# Patient Record
Sex: MALE | Race: WHITE | NOT HISPANIC OR LATINO | URBAN - NONMETROPOLITAN AREA
[De-identification: names, ages, dates, MRNs, and addresses within clinical notes are randomized per-mention and may not be internally consistent; named-entity substitution may affect disease eponyms.]

---
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
## 2022-02-03 ENCOUNTER — HOSPITAL ENCOUNTER (EMERGENCY)
Facility: HOSPITAL | Age: 22
Discharge: HOME | End: 2022-02-03
Attending: STUDENT IN AN ORGANIZED HEALTH CARE EDUCATION/TRAINING PROGRAM
Payer: MEDICAID

## 2022-02-03 VITALS
HEIGHT: 70 IN | OXYGEN SATURATION: 99 % | TEMPERATURE: 99 F | WEIGHT: 142.63 LBS | BODY MASS INDEX: 20.42 KG/M2 | SYSTOLIC BLOOD PRESSURE: 144 MMHG | RESPIRATION RATE: 18 BRPM | HEART RATE: 71 BPM | DIASTOLIC BLOOD PRESSURE: 77 MMHG

## 2022-02-03 DIAGNOSIS — S05.01XA ABRASION OF RIGHT CORNEA, INITIAL ENCOUNTER: Primary | ICD-10-CM

## 2022-02-03 PROCEDURE — 99283 EMERGENCY DEPT VISIT LOW MDM: CPT | Mod: 25

## 2022-02-03 PROCEDURE — 25000003 PHARM REV CODE 250: Performed by: STUDENT IN AN ORGANIZED HEALTH CARE EDUCATION/TRAINING PROGRAM

## 2022-02-03 RX ORDER — ERYTHROMYCIN 5 MG/G
OINTMENT OPHTHALMIC
Qty: 1 G | Refills: 0 | Status: SHIPPED | OUTPATIENT
Start: 2022-02-03

## 2022-02-03 RX ORDER — TETRACAINE HYDROCHLORIDE 5 MG/ML
2 SOLUTION OPHTHALMIC
Status: COMPLETED | OUTPATIENT
Start: 2022-02-03 | End: 2022-02-03

## 2022-02-03 RX ADMIN — FLUORESCEIN SODIUM 1 EACH: 1 STRIP OPHTHALMIC at 10:02

## 2022-02-03 RX ADMIN — TETRACAINE HYDROCHLORIDE 2 DROP: 5 SOLUTION OPHTHALMIC at 10:02

## 2022-02-03 NOTE — ED PROVIDER NOTES
Encounter Date: 2/3/2022       History     Chief Complaint   Patient presents with    Eye Pain     About a week and half ago something flew in his right eye and he has not had any relief from the pain. Pt is a .     21-year-old male with no significant past medical history presents with right eye pain that has been going on for a week after having something fly in his eye.  Patient has not tried to take anything for pain.  Patient says that his vision on the right eye has progressively gotten worse and is blurry now.  Patient does not wear contact.  Denies any other associated        Review of patient's allergies indicates:  No Known Allergies  No past medical history on file.  No past surgical history on file.  No family history on file.  Social History     Tobacco Use    Smoking status: Never Smoker    Smokeless tobacco: Never Used     Review of Systems   Constitutional: Negative.    HENT: Negative.    Eyes: Positive for pain, redness and visual disturbance.   Respiratory: Negative.    Cardiovascular: Negative.    Gastrointestinal: Negative.    Genitourinary: Negative.    Musculoskeletal: Negative.    Skin: Negative.    Neurological: Negative.    Psychiatric/Behavioral: Negative.    All other systems reviewed and are negative.      Physical Exam     Initial Vitals [02/03/22 0946]   BP Pulse Resp Temp SpO2   (!) 144/77 71 18 98.9 °F (37.2 °C) 99 %      MAP       --         Physical Exam    Nursing note and vitals reviewed.  Constitutional: Vital signs are normal. He appears well-developed and well-nourished.   HENT:   Head: Normocephalic and atraumatic.   Eyes: Lids are normal.   Conjunctival injection.  No Alisa sign.  Corneal abrasion to left upper iris   Neck: Trachea normal. Neck supple.   Cardiovascular: Normal rate, regular rhythm, normal heart sounds and normal pulses.   Pulmonary/Chest: Breath sounds normal. He has no wheezes. He has no rhonchi.   Abdominal: Abdomen is soft. Bowel sounds are  normal. He exhibits no distension. There is no abdominal tenderness. There is no rebound and no guarding.   Musculoskeletal:         General: Normal range of motion.      Cervical back: Neck supple.     Neurological: He is alert and oriented to person, place, and time. He has normal strength. GCS eye subscore is 4. GCS verbal subscore is 5. GCS motor subscore is 6.   Skin: Skin is warm. Capillary refill takes less than 2 seconds.   Psychiatric: He has a normal mood and affect. His speech is normal. Thought content normal.         ED Course   Procedures  Labs Reviewed - No data to display       Imaging Results    None          Medications   TETRAcaine HCl (PF) 0.5 % Drop 2 drop (2 drops Right Eye Given 2/3/22 1047)   fluorescein ophthalmic strip 1 each (1 each Right Eye Given 2/3/22 1047)     Medical Decision Making:   Initial Assessment:   21-year-old male with no significant past medical history presents with right eye pain that has been going on for a week after having something fly in his eye.  Afebrile vitals stable.  Physical noted.  Patient has corneal abrasion.  Symptoms relieved that the tetracaine.  Corneal abrasion on iris not involving pupil.  Will erythromycin and  opthalmology                      Clinical Impression:   Final diagnoses:  [S05.01XA] Abrasion of right cornea, initial encounter (Primary)          ED Disposition Condition    Discharge Stable        ED Prescriptions     Medication Sig Dispense Start Date End Date Auth. Provider    erythromycin (ROMYCIN) ophthalmic ointment Place a 1/2 inch ribbon of ointment into the lower eyelid. 5 days 1 g 2/3/2022  David Bowie MD        Follow-up Information     Follow up With Specialties Details Why Contact Info    Bradley Pink MD Ophthalmology   [address] [facility] [address]  [facility]  [facility] [address]  [phone]             David Bowie MD  02/03/22 9759

## 2022-02-03 NOTE — Clinical Note
"Bart Lisamichael" Randy was seen and treated in our emergency department on 2/3/2022.  He may return to work on 02/07/2022.       If you have any questions or concerns, please don't hesitate to call.      David Bowie MD"

## 2022-03-02 ENCOUNTER — HOSPITAL ENCOUNTER (EMERGENCY)
Facility: HOSPITAL | Age: 22
Discharge: HOME | End: 2022-03-02
Attending: STUDENT IN AN ORGANIZED HEALTH CARE EDUCATION/TRAINING PROGRAM
Payer: MEDICAID

## 2022-03-02 VITALS
OXYGEN SATURATION: 100 % | BODY MASS INDEX: 20.76 KG/M2 | DIASTOLIC BLOOD PRESSURE: 87 MMHG | WEIGHT: 145 LBS | TEMPERATURE: 98 F | RESPIRATION RATE: 16 BRPM | SYSTOLIC BLOOD PRESSURE: 131 MMHG | HEART RATE: 88 BPM | HEIGHT: 70 IN

## 2022-03-02 DIAGNOSIS — R07.81 RIB PAIN ON RIGHT SIDE: Primary | ICD-10-CM

## 2022-03-02 DIAGNOSIS — T14.90XA INJURY: ICD-10-CM

## 2022-03-02 PROCEDURE — 25000003 PHARM REV CODE 250: Performed by: NURSE PRACTITIONER

## 2022-03-02 PROCEDURE — 99284 EMERGENCY DEPT VISIT MOD MDM: CPT | Mod: 25

## 2022-03-02 RX ORDER — IBUPROFEN 600 MG/1
600 TABLET ORAL
Status: COMPLETED | OUTPATIENT
Start: 2022-03-02 | End: 2022-03-02

## 2022-03-02 RX ADMIN — IBUPROFEN 600 MG: 600 TABLET, FILM COATED ORAL at 06:03
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
## 2022-03-03 NOTE — DISCHARGE INSTRUCTIONS
Alternate Tylenol and Motrin every 3 hours as directed for pain.  See your primary doctor in 1-2 days, and return to the emergency room for worsening condition.